# Patient Record
Sex: FEMALE | Race: WHITE | Employment: UNEMPLOYED | URBAN - NONMETROPOLITAN AREA
[De-identification: names, ages, dates, MRNs, and addresses within clinical notes are randomized per-mention and may not be internally consistent; named-entity substitution may affect disease eponyms.]

---

## 2020-01-09 ENCOUNTER — HOSPITAL ENCOUNTER (OUTPATIENT)
Dept: WOUND CARE | Age: 70
Discharge: HOME | End: 2020-01-09
Payer: MEDICARE

## 2020-01-09 VITALS
RESPIRATION RATE: 16 BRPM | DIASTOLIC BLOOD PRESSURE: 57 MMHG | HEART RATE: 83 BPM | OXYGEN SATURATION: 98 % | SYSTOLIC BLOOD PRESSURE: 128 MMHG | TEMPERATURE: 97.7 F

## 2020-01-09 PROBLEM — L97.929 VENOUS ULCER OF LEFT LEG (HCC): Status: ACTIVE | Noted: 2020-01-09

## 2020-01-09 PROBLEM — I83.029 VENOUS ULCER OF LEFT LEG (HCC): Status: ACTIVE | Noted: 2020-01-09

## 2020-01-09 PROBLEM — I73.9 PVD (PERIPHERAL VASCULAR DISEASE) (HCC): Status: ACTIVE | Noted: 2020-01-09

## 2020-01-09 PROBLEM — I87.2 VENOUS INSUFFICIENCY OF BOTH LOWER EXTREMITIES: Status: ACTIVE | Noted: 2020-01-09

## 2020-01-09 PROBLEM — I89.0 LYMPHEDEMA OF BOTH LOWER EXTREMITIES: Status: ACTIVE | Noted: 2020-01-09

## 2020-01-09 PROCEDURE — 99203 OFFICE O/P NEW LOW 30 MIN: CPT | Performed by: NURSE PRACTITIONER

## 2020-01-09 PROCEDURE — 99203 OFFICE O/P NEW LOW 30 MIN: CPT

## 2020-01-09 PROCEDURE — 2709999900 HC NON-CHARGEABLE SUPPLY

## 2020-01-09 RX ORDER — IPRATROPIUM BROMIDE AND ALBUTEROL SULFATE 2.5; .5 MG/3ML; MG/3ML
1 SOLUTION RESPIRATORY (INHALATION) EVERY 6 HOURS PRN
COMMUNITY

## 2020-01-09 RX ORDER — LEVOTHYROXINE SODIUM 0.03 MG/1
25 TABLET ORAL DAILY
COMMUNITY

## 2020-01-09 RX ORDER — TRAZODONE HYDROCHLORIDE 50 MG/1
50 TABLET ORAL NIGHTLY
COMMUNITY

## 2020-01-09 RX ORDER — POLYETHYLENE GLYCOL 3350 17 G/17G
17 POWDER, FOR SOLUTION ORAL DAILY PRN
COMMUNITY

## 2020-01-09 RX ORDER — BUMETANIDE 1 MG/1
1 TABLET ORAL DAILY
COMMUNITY

## 2020-01-09 RX ORDER — SPIRONOLACTONE 25 MG/1
25 TABLET ORAL 2 TIMES DAILY
COMMUNITY

## 2020-01-09 RX ORDER — SENNA PLUS 8.6 MG/1
1 TABLET ORAL DAILY
COMMUNITY

## 2020-01-09 RX ORDER — HYDROCODONE BITARTRATE AND ACETAMINOPHEN 5; 325 MG/1; MG/1
1 TABLET ORAL EVERY 8 HOURS PRN
COMMUNITY

## 2020-01-09 RX ORDER — DULOXETIN HYDROCHLORIDE 60 MG/1
60 CAPSULE, DELAYED RELEASE ORAL 2 TIMES DAILY
COMMUNITY

## 2020-01-09 RX ORDER — FERROUS SULFATE 325(65) MG
325 TABLET ORAL
COMMUNITY

## 2020-01-09 RX ORDER — EZETIMIBE 10 MG/1
10 TABLET ORAL DAILY
COMMUNITY

## 2020-01-09 RX ORDER — METOPROLOL TARTRATE 50 MG/1
50 TABLET, FILM COATED ORAL 3 TIMES DAILY
COMMUNITY

## 2020-01-09 ASSESSMENT — PAIN DESCRIPTION - LOCATION: LOCATION: LEG;FOOT

## 2020-01-09 ASSESSMENT — PAIN SCALES - GENERAL: PAINLEVEL_OUTOF10: 8

## 2020-01-09 ASSESSMENT — PAIN DESCRIPTION - ORIENTATION: ORIENTATION: LEFT

## 2020-01-09 ASSESSMENT — PAIN DESCRIPTION - PAIN TYPE: TYPE: ACUTE PAIN

## 2020-01-09 NOTE — PROGRESS NOTES
[facility]         Consult Note      Mary Boland  MEDICAL RECORD NUMBER:  [id number]  AGE: 71 y.o. GENDER: female  : 1950  EPISODE DATE:  2020    Subjective:     Chief Complaint   Patient presents with    Wound Check     left leg         HISTORY of PRESENT ILLNESS HPI     Mary Boland is a 71 y.o. female New patient referred by Dr. Devin Villasenor, who presents today for wound/ulcer evaluation. History of Wound Context: Patient has been having issues with bilateral lower leg edema and cellulitis on and off for several years. She has been treated with multiple antibiotics with no improvement. She had arterial dopplers done on 18: normal BARBARA's with mostly biphasic and monophasic waveforms.   Wound/Ulcer Pain Timing/Severity: moderate  Quality of pain: aching, tender  Severity:  8 / 10   Modifying Factors: Pain worsens with movement  Associated Signs/Symptoms: edema and pain        PAST MEDICAL HISTORY        Diagnosis Date    Anemia     Chest pain     Chills     Chronic kidney disease     COPD (chronic obstructive pulmonary disease) (HCC)     Depression     Dizzy spells     GERD (gastroesophageal reflux disease)     Headache(784.0)     Hearing loss     Hoarseness     Hyperlipidemia     Hypertension     Loss of vision     Melanoma (HCC)     Myelocytic leukemia (HCC)     Numbness and tingling     arms and legs    Osteoarthritis     Sinus infection     SOB (shortness of breath)        PAST SURGICAL HISTORY    Past Surgical History:   Procedure Laterality Date    BONE MARROW TRANSPLANT      x 2    BRAIN SURGERY      CATARACT REMOVAL WITH IMPLANT      COLONOSCOPY      KNEE SURGERY      LAPAROSCOPY      TUBAL LIGATION      UPPER GASTROINTESTINAL ENDOSCOPY  2015    WRIST SURGERY         FAMILY HISTORY    Family History   Problem Relation Age of Onset    Stroke Mother     Lung Cancer Mother 72    Lung Cancer Father 70    Lupus Daughter     Other Daughter         allergies    Colon Cancer Maternal Cousin 32       SOCIAL HISTORY    Social History     Tobacco Use    Smoking status: Former Smoker     Packs/day: 0.25     Years: 22.00     Pack years: 5.50     Types: Cigarettes     Last attempt to quit: 10/15/2015     Years since quittin.2    Smokeless tobacco: Never Used   Substance Use Topics    Alcohol use: Yes     Comment: Occasional; twice a year    Drug use: No       ALLERGIES    Allergies   Allergen Reactions    Latex     Amiodarone        MEDICATIONS    Current Outpatient Medications on File Prior to Encounter   Medication Sig Dispense Refill    ferrous sulfate 325 (65 Fe) MG tablet Take 325 mg by mouth daily (with breakfast)      POTASSIUM CHLORIDE PO Take 20 mEq by mouth daily      aspirin 81 MG tablet Take 81 mg by mouth daily      levothyroxine (SYNTHROID) 25 MCG tablet Take 25 mcg by mouth Daily      buPROPion HCl (WELLBUTRIN PO) Take 100 mg by mouth daily      polyethylene glycol (GLYCOLAX) packet Take 17 g by mouth daily as needed for Constipation      spironolactone (ALDACTONE) 25 MG tablet Take 25 mg by mouth 2 times daily      senna (SENOKOT) 8.6 MG tablet Take 1 tablet by mouth daily      bumetanide (BUMEX) 1 MG tablet Take 1 mg by mouth daily Take 2 tablets in the morning and 1 tablet at 4 pm      DULoxetine (CYMBALTA) 60 MG extended release capsule Take 60 mg by mouth 2 times daily      Probiotic Product (ACIDOPHILUS/BIFIDUS PO) Take by mouth 3 times daily      metoprolol tartrate (LOPRESSOR) 50 MG tablet Take 50 mg by mouth 3 times daily      HYDROcodone-acetaminophen (NORCO) 5-325 MG per tablet Take 1 tablet by mouth every 8 hours as needed for Pain.       traZODone (DESYREL) 50 MG tablet Take 50 mg by mouth nightly      ezetimibe (ZETIA) 10 MG tablet Take 10 mg by mouth daily      Evolocumab (REPATHA) 140 MG/ML SOSY Inject into the skin every 14 days      Simethicone 250 MG CAPS Take by mouth 3 times daily Take 2 sounds normal  Extremities: no cyanosis, no clubbing and 3 + edema-  bilateral lower legs; delfino discoloration noted to bilateral lower legs and feet with hemosiderin staining noted  Musculoskeletal: normal range of motion of extremities x 4  Neurologic: gait and coordination normal and speech normal  Left lower leg: Small open area noted draining scant serosanguinous drainage. Discoloration and edema noted. No warmth noted. Right lower leg    Left lower leg       LABS       CBC: No results found for: WBC, HGB, HCT, MCV, PLT  BMP: No results found for: NA, K, CL, CO2, PHOS, BUN, CREATININE  PT/INR: No results found for: PROTIME, INR  Prealbumin: No results found for: PREALBUMIN  Albumin:No results found for: LABALBU  Sed Rate:No results found for: SEDRATE  CRP: No results found for: CRP  Micro: No results found for: BC   Hemoglobin A1C: No results found for: LABA1C    Assessment:     Venous ulcer of left leg, limited to breakdown of skin  Venous insufficiency of both lower extremities  Lymphedema of both lower extremities  PVD    Contributing Factors: edema, venous stasis, lymphedema and obesity    Patient Active Problem List   Diagnosis Code    Dysphagia R13.10    Chronic reflux esophagitis K21.0    Esophageal stricture K22.2    Hemorrhoids K64.9    Lymphedema of both lower extremities I89.0    Venous insufficiency of both lower extremities I87.2    PVD (peripheral vascular disease) ([facility] I73.9    Ven[facility] ulcer [facility] left leg ([facility] I83.029, L97.929       Procedure Note  Indications:  Based on my examination of this patient's wound(s)/ulcer(s) today, debridement is not required to promote healing and evaluate the extent healing. Wound/Ulcer Descriptions are listed under Physical Exam above. Plan:     Patient examined and evaluated. Patient has significant bilateral lower leg lymphedema concerns for arterial insufficiency.  She will need long term compression to the lower legs to prevent breakdown but need to reassess her arterial flow first. No current sign of infection noted. Arterial Dopplers and with segmental pressures ordered to assess patient's vascular status    Apply moisturizer to both legs every day. Bilateral lower legs: Apply kerlix and ace wraps to both legs wrapping from he base of the toes to the bend of he knee. Change every day    Treatment:   Orders Placed This Encounter   Procedures    VL LOWER EXTREMITY ARTERIAL SEGMENTAL PRESSURES W PPG     Standing Status:   Future     Standing Expiration Date:   1/9/2021     Order Specific Question:   Reason for exam:     Answer:   diminished pulses bilateral lower legs, venous ulcer left leg         Antibiotics: No    Follow up: 3 weeks    Please see attached Discharge Instructions    Written patient dismissal instructions given to patient and signed by patient or POA. Discharge Instructions       Visit Discharge/Physician Orders: nursing home to schedule arterial dopplers with BARBARA segmental pressures for both lower legs    Home Care: [address] [place] [address]    Wound Location: left leg    Dressing orders:     1) Gather wound care supplies and arrange on clean table. 2) Wash your hands with soap and water or use alcohol based hand  for 20 seconds (sing \"Happy Birthday\" twice). 3) Cleanse legs and feet with soap and water. Apply moisturizer to both legs every day. 4) Apply kerlix and ace wraps to both legs wrapping from he base of the toes to the bend of he knee. Change every day    Follow up visit:  3  Weeks 1/30/19 at [address] [place] [address] [address] next scheduled appointment. Please give 24 hour notice if unable to keep appointment. [phone]    If you experience any of the following, please call the Wound Care Service during business hours: Monday through Friday 8:00 am - 4:30 pm  ([phone]).    *Increase in pain   *Temperature over 101   *Increase in drainage from your wound or a foul odor   *Uncontrolled swelling   *Need for compression bandage changes due to slippage, breakthrough drainage    If you need medical attention outside of business hours, please contact your Primary Care Doctor or go to the nearest emergency room.                    Electronically signed by CRYSTAL Gaviria CNP on 1/9/2020 at 10:50 AM

## 2020-01-09 NOTE — PLAN OF CARE
Pt. Seen today for bilateral leg edema see AVS for new orders. Follow up in 3 weeks. Care plan reviewed with patient. Patient verbalize understanding of the plan of care and contribute to goal setting.

## 2020-01-30 ENCOUNTER — HOSPITAL ENCOUNTER (OUTPATIENT)
Dept: WOUND CARE | Age: 70
Discharge: HOME | End: 2020-01-30
Payer: MEDICARE

## 2020-01-30 VITALS
SYSTOLIC BLOOD PRESSURE: 152 MMHG | RESPIRATION RATE: 146 BRPM | TEMPERATURE: 97.2 F | HEART RATE: 79 BPM | OXYGEN SATURATION: 99 % | DIASTOLIC BLOOD PRESSURE: 72 MMHG

## 2020-01-30 PROCEDURE — 99213 OFFICE O/P EST LOW 20 MIN: CPT | Performed by: NURSE PRACTITIONER

## 2020-01-30 PROCEDURE — 99212 OFFICE O/P EST SF 10 MIN: CPT

## 2020-01-30 ASSESSMENT — PAIN DESCRIPTION - ORIENTATION: ORIENTATION: RIGHT;LEFT

## 2020-01-30 ASSESSMENT — PAIN DESCRIPTION - LOCATION: LOCATION: LEG

## 2020-01-30 ASSESSMENT — PAIN SCALES - GENERAL: PAINLEVEL_OUTOF10: 8

## 2020-01-30 NOTE — PLAN OF CARE
Problem: Wound:  Goal: Absence of new skin breakdown  Outcome: Ongoing     Patient presents to wound clinic for bilateral leg swelling. No open wounds noted. See discharge instructions in AVS for new orders. Patient discharged from wound clinic and is to follow up as needed. Care plan reviewed with patient. Patient verbalize understanding of the plan of care and contribute to goal setting.

## 2020-01-30 NOTE — PROGRESS NOTES
[facility]          Progress Note      Georg Eisenmenger  MEDICAL RECORD NUMBER:  [id number]  AGE: 71 y.o. GENDER: female  : 1950  EPISODE DATE:  2020    Subjective:     Chief Complaint   Patient presents with    Wound Check     bilateral legs         HISTORY of PRESENT ILLNESS HPI     Georg Eisenmenger is a 71 y.o. female Established patient referred by Dr. Junior Fernando, who presents today for wound/ulcer evaluation. History of Wound Context: Patient has been having issues with bilateral lower leg edema and cellulitis on and off for several years. She has been treated with multiple antibiotics with no improvement. She had arterial dopplers done on 18: normal BARBARA's with mostly biphasic and monophasic waveforms. Right pedal pulses absent with Doppler, right posterior tibial and left pedal pulse is weak with Doppler, and left posterior tibial pulses strong with Doppler. She had an arterial duplex ultrasound done on 2020 however the distal posterior tibial and anterior tibial arteries were not assessed due to bandages not being removed from the distal extremities and the iliac arteries were not seen due to body habitus. Wound/Ulcer Pain Timing/Severity: moderate  Quality of pain: aching, tender  Severity:  8 / 10   Modifying Factors: Pain worsens with walking  Associated Signs/Symptoms: edema and pain    Interval History:   Patient presents today for follow up on wound/ulcer's progression. The patient is currently not on antibiotics. Current dressing care includes:    Bilateral lower legs: Apply kerlix and ace wraps to both legs wrapping from he base of the toes to the bend of the knee. Change every day    **Patient arrived without any type of dressing or compression in place. She admits she has not been using compression on a regular basis.         PAST MEDICAL HISTORY        Diagnosis Date    Anemia     Chest pain     Chills     Chronic kidney disease     COPD (chronic obstructive pulmonary disease) ([facility]     Depression     Dizzy spells     GERD (gastroesophageal reflux disease)     Headache(784.0)     Hearing loss     Hoarseness     Hyperlipidemia     Hypertension     Loss of vision     Melanoma (HCC)     Myelocytic leukemia (HCC)     Numbness and tingling     arms and legs    Osteoarthritis     Sinus infection     SOB (shortness of breath)        PAST SURGICAL HISTORY    Past Surgical History:   Procedure Laterality Date    BONE MARROW TRANSPLANT      x 2    BRAIN SURGERY      CATARACT REMOVAL WITH IMPLANT      COLONOSCOPY      KNEE SURGERY      LAPAROSCOPY      TUBAL LIGATION      UPPER GASTROINTESTINAL ENDOSCOPY      WRIST SURGERY         FAMILY HISTORY    Family History   Problem Relation Age of Onset    Stroke Mother     Lung Cancer Mother 72    Lung Cancer Father 70    Lupus Daughter     Other Daughter         allergies    Colon Cancer Maternal Cousin 32       SOCIAL HISTORY    Social History     Tobacco Use    Smoking status: Former Smoker     Packs/day: 0.25     Years: 22.00     Pack years: 5.50     Types: Cigarettes     Last attempt to quit: 10/15/2015     Years since quittin.2    Smokeless tobacco: Never Used   Substance Use Topics    Alcohol use: Yes     Comment: Occasional; twice a year    Drug use: No       ALLERGIES    Allergies   Allergen Reactions    Latex     Amiodarone        MEDICATIONS    Current Outpatient Medications on File Prior to Encounter   Medication Sig Dispense Refill    ferrous sulfate 325 (65 Fe) MG tablet Take 325 mg by mouth daily (with breakfast)      POTASSIUM CHLORIDE PO Take 20 mEq by mouth daily      aspirin 81 MG tablet Take 81 mg by mouth daily      levothyroxine (SYNTHROID) 25 MCG tablet Take 25 mcg by mouth Daily      buPROPion HCl (WELLBUTRIN PO) Take 100 mg by mouth daily      polyethylene glycol (GLYCOLAX) packet Take 17 g by mouth daily as needed for Constipation      wound/ulcer  Musculoskeletal:positive for muscle weakness  Neurological: positive for gait problems, negative for memory problems and speech problems  Behavioral/Psych: positive for good mood    Objective:      BP (!) 152/72   Pulse 79   Temp 97.2 °F (36.2 °C) (Oral)   Resp (!) 146   SpO2 99%     Wt Readings from Last 3 Encounters:   09/21/15 254 lb 9.6 oz (115.5 kg)   06/23/15 255 lb (115.7 kg)   05/26/15 254 lb 3.2 oz (115.3 kg)       PHYSICAL EXAM    General Appearance: alert and oriented to person, place and time and obese  Skin: warm and dry  Head: normocephalic and atraumatic  Eyes: conjunctivae normal  ENT: hearing grossly normal bilaterally  Pulmonary/Chest: clear to auscultation bilaterally- no wheezes, rales or rhonchi, normal air movement, no respiratory distress; on O2 @ 2L per NC  Cardiovascular: normal rate, regular rhythm and distal pulses diminished  Abdomen: soft, non-tender and bowel sounds normal  Extremities: 3 + edema-  bilateral lower legs; delfino discoloration noted to bilateral lower legs and feet with hemosiderin staining noted and lack of hair growth; nails to bilateral feet are long, thick, and dystrophic  Musculoskeletal: normal range of motion of extremities x 4  Neurologic: gait and coordination normal and speech normal  Left lower leg: No open area noted. There is chronic discoloration and edema noted. No drainage. No warmth noted.       Bilateral lower legs       LABS      CBC: No results found for: WBC, HGB, HCT, MCV, PLT  BMP: No results found for: NA, K, CL, CO2, PHOS, BUN, CREATININE  PT/INR: No results found for: PROTIME, INR  Prealbumin: No results found for: PREALBUMIN  Albumin:No results found for: LABALBU  Sed Rate:No results found for: SEDRATE  CRP: No results found for: CRP  Micro: No results found for: BC   Hemoglobin A1C: No results found for: LABA1C    Assessment:     Venous ulcer of left leg, limited to breakdown of skin-resolved  Venous insufficiency of both lower extremities  Lymphedema of both lower extremities  PVD     Contributing Factors: edema, venous stasis, lymphedema and obesity    Patient Active Problem List   Diagnosis Code    Dysphagia R13.10    Chronic reflux esophagitis K21.0    Esophageal stricture K22.2    Hemorrhoids K64.9    Lymphedema of both lower extremities I89.0    Venous insufficiency of both lower extremities I87.2    PVD [facility]peripheral vascular disease) ([facility] I73.9    [facility] I83.029, L97.929       Procedure Note  Indications:  Based on my examination of this patient's wound(s)/ulcer(s) today, debridement is not required to promote healing and evaluate the extent healing. Wound/Ulcer Descriptions are listed under Physical Exam above. Plan:     Patient examined and evaluated. There is no open areas currently to the bilateral lower legs. No drainage. No sign of infection. Patient has not been compliant with use of dressings/wraps or compression to her lower legs. Patient has a combination of mixed arterial and venous disease with lymphedema. She needs aggressive compression however this cannot be initiated until after her arterial insufficiency is addressed. Her right pedal pulse is absent with Doppler, right posterior tibial and left pedal pulse is weak with Doppler. Arterial duplex that she had done on 1/15/2020 was reviewed and is inaccurate due to the distal posterior tibial and distal anterior tibial arteries not being assessed-it was reported that dressings were not removed for the assessment. Repeat arterial Dopplers with ABIs/segmental pressures, be sure to remove dressings from legs for proper assessment    Recommend follow-up with patient's interventional cardiologist for her severe PVD of the lower extremities-may need intervention    Bilateral lower legs-moisturize daily. Apply compression stockings 20 to 30 mmHg in the a.m. and remove at bedtime daily to help manage her edema.     Antibiotics: drainage     If you need medical attention outside of business hours, please contact your Primary Care Doctor or go to the nearest emergency room.      Electronically signed by CRYSTAL Sherman CNP on 1/30/20 at 1:20 PM            Electronically signed by CRYSTAL Sherman CNP on 1/30/2020 at 1:32 PM